# Patient Record
Sex: FEMALE | Race: BLACK OR AFRICAN AMERICAN | Employment: FULL TIME | URBAN - METROPOLITAN AREA
[De-identification: names, ages, dates, MRNs, and addresses within clinical notes are randomized per-mention and may not be internally consistent; named-entity substitution may affect disease eponyms.]

---

## 2017-06-07 ENCOUNTER — HOSPITAL ENCOUNTER (EMERGENCY)
Age: 42
Discharge: HOME | End: 2017-06-07
Attending: EMERGENCY MEDICINE
Payer: COMMERCIAL

## 2017-06-07 VITALS
WEIGHT: 170 LBS | HEART RATE: 83 BPM | BODY MASS INDEX: 29.02 KG/M2 | RESPIRATION RATE: 20 BRPM | HEIGHT: 64 IN | OXYGEN SATURATION: 99 % | TEMPERATURE: 97 F | SYSTOLIC BLOOD PRESSURE: 174 MMHG | DIASTOLIC BLOOD PRESSURE: 109 MMHG

## 2017-06-07 DIAGNOSIS — I10 ESSENTIAL HYPERTENSION: Primary | ICD-10-CM

## 2017-06-07 PROCEDURE — 99283 EMERGENCY DEPT VISIT LOW MDM: CPT

## 2017-06-07 RX ORDER — TRIAMTERENE AND HYDROCHLOROTHIAZIDE 75; 50 MG/1; MG/1
0.5 TABLET ORAL DAILY
Qty: 30 TABLET | Refills: 0 | Status: SHIPPED | OUTPATIENT
Start: 2017-06-07 | End: 2017-06-30

## 2017-06-08 NOTE — ED PROVIDER NOTES
Patient Seen in: THE [facility] Emergency Department In [place]    History   Patient presents with:  Neck Pain (musculoskeletal, neurologic)  Hypertension (cardiovascular)    Stated Complaint: \"feels like BP high\", c/o neck pain, used to be on BP med, no PM All other systems reviewed and negative except as noted above. PSFH elements reviewed from today and agreed except as otherwise stated in HPI.     Physical Exam     ED Triage Vitals   BP 06/07/17 2115 174/109 mmHg   Pulse 06/07/17 2115 83   Resp 06/0 Prescribed:  Discharge Medication List as of 6/7/2017  9:35 PM    START taking these medications    !! Triamterene-HCTZ (MAXZIDE) 75-50 MG Oral Tab  Take 0.5 tablets by mouth daily. , Normal, Disp-30 tablet, R-0    !! - Potential duplicate medications found

## 2017-06-08 NOTE — ED INITIAL ASSESSMENT (HPI)
Pt states, \"I haven't been on my blood pressure medication for almost 6 months. My insurance changed and I don't have a regular doctor anymore for prescriptions.  I started to have some neck cramping, and I know that can be associated with high blood press

## 2018-03-23 PROCEDURE — 87480 CANDIDA DNA DIR PROBE: CPT | Performed by: OBSTETRICS & GYNECOLOGY

## 2018-03-23 PROCEDURE — 87624 HPV HI-RISK TYP POOLED RSLT: CPT | Performed by: OBSTETRICS & GYNECOLOGY

## 2018-03-23 PROCEDURE — 88175 CYTOPATH C/V AUTO FLUID REDO: CPT | Performed by: OBSTETRICS & GYNECOLOGY

## 2018-03-23 PROCEDURE — 87660 TRICHOMONAS VAGIN DIR PROBE: CPT | Performed by: OBSTETRICS & GYNECOLOGY

## 2018-03-23 PROCEDURE — 87510 GARDNER VAG DNA DIR PROBE: CPT | Performed by: OBSTETRICS & GYNECOLOGY

## (undated) NOTE — ED AVS SNAPSHOT
[facility] in [address] [facility]    Phone:  [phone]    Fax:  Rayo Lloyd   MRN: [id number]    Department:  [facility] in [place]   Date of Vi IF THERE IS ANY CHANGE OR WORSENING OF YOUR CONDITION, CALL YOUR PRIMARY CARE PHYSICIAN AT ONCE OR RETURN IMMEDIATELY TO THE EMERGENCY DEPARTMENT.     If you have been prescribed any medication(s), please fill your prescription right away and begin taking t

## (undated) NOTE — ED AVS SNAPSHOT
[facility] in [address] [facility]    Phone:  [phone]    [facility]  [facility]   MRN: [id number]    Department:  [facility] in [place]   Date of Vi coverage for follow-up care and referrals. [address] [facility] [employer] [place] [phone]  Pediatric [id number] Emergency Department   [phone]       To Check ER Wait Times:  TEXT 'ERwait' to [id number]      Click [email or web address] will be contacted. Please make sure we have your correct phone number before you leave. After you leave, you should follow the attached instructions. I have read and understand the instructions given to me by my caregivers.         [facility]